# Patient Record
Sex: FEMALE | Race: AMERICAN INDIAN OR ALASKA NATIVE | URBAN - METROPOLITAN AREA
[De-identification: names, ages, dates, MRNs, and addresses within clinical notes are randomized per-mention and may not be internally consistent; named-entity substitution may affect disease eponyms.]

---

## 2023-04-07 ENCOUNTER — OFFICE VISIT (OUTPATIENT)
Dept: URBAN - METROPOLITAN AREA CLINIC 124 | Facility: CLINIC | Age: 38
End: 2023-04-07
Payer: MEDICAID

## 2023-04-07 ENCOUNTER — WEB ENCOUNTER (OUTPATIENT)
Dept: URBAN - METROPOLITAN AREA CLINIC 124 | Facility: CLINIC | Age: 38
End: 2023-04-07

## 2023-04-07 ENCOUNTER — DASHBOARD ENCOUNTERS (OUTPATIENT)
Age: 38
End: 2023-04-07

## 2023-04-07 VITALS
HEART RATE: 90 BPM | WEIGHT: 128 LBS | HEIGHT: 63 IN | TEMPERATURE: 97.3 F | BODY MASS INDEX: 22.68 KG/M2 | SYSTOLIC BLOOD PRESSURE: 107 MMHG | DIASTOLIC BLOOD PRESSURE: 76 MMHG

## 2023-04-07 DIAGNOSIS — K90.41 GLUTEN INTOLERANCE: ICD-10-CM

## 2023-04-07 DIAGNOSIS — K59.04 CHRONIC IDIOPATHIC CONSTIPATION: ICD-10-CM

## 2023-04-07 DIAGNOSIS — A04.8 HELICOBACTER PYLORI (H. PYLORI) INFECTION: ICD-10-CM

## 2023-04-07 DIAGNOSIS — M25.542 PAIN IN JOINTS OF LEFT HAND: ICD-10-CM

## 2023-04-07 DIAGNOSIS — M25.541 PAIN IN JOINTS OF RIGHT HAND: ICD-10-CM

## 2023-04-07 DIAGNOSIS — L65.9 HAIR LOSS: ICD-10-CM

## 2023-04-07 PROBLEM — 441831003: Status: ACTIVE | Noted: 2023-04-07

## 2023-04-07 PROCEDURE — 99204 OFFICE O/P NEW MOD 45 MIN: CPT | Performed by: INTERNAL MEDICINE

## 2023-04-07 NOTE — HPI-TODAY'S VISIT:
April 2023 visit : Doing " comprehensive gut repair program" . started 5 days ago. taking PO iron for 1 yr. hematologist - dr sai lyle. heme felt ferritin was normal.   Seeing functional GYN dr sanna aquino MD  Started OC Pills since feb 2021. she took this for 8 months. soon after this started to have diarrhea. then began to have hairloss. she stopped OC Pills. hairloss stopped but diarrhea didnt stop. Saw derm - found to have elevated prolactin. Then started random aches and pain / nausea. Found to have Positive MARILYN . SHe has seen multiple neurospecialists and was told no operative intervention needed. She saw 2 rheumatologist for positive MARILYN. Was felt to have no lupus. Has also seen multiple endocrine docs and was told no intervention.   took meds for H Pylori in Jan 2023. then developed vaginal odor and was dx with BV. Gyn prescribed treatment. Was taking plant based " vitex / chasteberry " since 3 months.  She does not have any abdominal pain, bloating, normally has diarrhea but only recently has been experiencing more constipation.  She is very frustrated because she is not really sure how to manage her symptoms despite seeing multiple specialists.  She thinks her symptoms may be from SIBO.  She has had a battery of tests which are scanned to her chart, some of it ordered by functional medicine provider which I cannot interpret. stool calpro was normal. stool elastase is normal. stool pathogens negative. parasites / virus negative.   thinks she has SIBO. Stools are mostly hard. hla dqb1 gene present. off gluten and dairy. no rectal bleeding. but thinks stools look " floating pieces".

## 2023-04-14 ENCOUNTER — OFFICE VISIT (OUTPATIENT)
Dept: URBAN - METROPOLITAN AREA CLINIC 91 | Facility: CLINIC | Age: 38
End: 2023-04-14

## 2023-04-14 ENCOUNTER — TELEPHONE ENCOUNTER (OUTPATIENT)
Dept: URBAN - METROPOLITAN AREA CLINIC 25 | Facility: CLINIC | Age: 38
End: 2023-04-14

## 2023-04-27 ENCOUNTER — OFFICE VISIT (OUTPATIENT)
Dept: URBAN - METROPOLITAN AREA CLINIC 96 | Facility: CLINIC | Age: 38
End: 2023-04-27